# Patient Record
Sex: MALE | Race: OTHER | Employment: OTHER | URBAN - METROPOLITAN AREA
[De-identification: names, ages, dates, MRNs, and addresses within clinical notes are randomized per-mention and may not be internally consistent; named-entity substitution may affect disease eponyms.]

---

## 2020-04-29 ENCOUNTER — IMPORTED ENCOUNTER (OUTPATIENT)
Dept: URBAN - METROPOLITAN AREA CLINIC 31 | Facility: CLINIC | Age: 65
End: 2020-04-29

## 2020-04-29 PROBLEM — E11.9: Noted: 2020-04-29

## 2020-04-29 PROBLEM — E11.3393: Noted: 2020-04-29

## 2020-04-29 PROCEDURE — 92004 COMPRE OPH EXAM NEW PT 1/>: CPT

## 2020-04-29 PROCEDURE — 92250 FUNDUS PHOTOGRAPHY W/I&R: CPT

## 2020-04-29 PROCEDURE — 92015 DETERMINE REFRACTIVE STATE: CPT

## 2020-04-29 NOTE — PATIENT DISCUSSION
1. DM II without sign of Diabetic Retinopathy OU:  Discussed the pathophysiology of diabetes and its effect on the eye. Stressed the importance of regular followup and good control of BS BP and Lipids to avoid future complications. 2. Refractive error - Glasses change optional. 3.  Return for an appointment in 1 year for comprehensive exam and Optos with Dr. Angel Mello.

## 2020-04-29 NOTE — PATIENT DISCUSSION
1. 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 Stressed the importance of regular followup and good control of BS BP and Lipids to avoid future complications. 2. Refractive error - Glasses change optional. 3.  Return for an appointment in 1 year for comprehensive exam and Optos with Dr. Zachary De Leon.

## 2021-04-06 ENCOUNTER — IMPORTED ENCOUNTER (OUTPATIENT)
Dept: URBAN - METROPOLITAN AREA CLINIC 31 | Facility: CLINIC | Age: 66
End: 2021-04-06

## 2021-04-06 PROBLEM — E11.3393: Noted: 2021-04-06

## 2021-04-06 PROBLEM — E11.9: Noted: 2021-04-06

## 2021-04-06 PROCEDURE — 92014 COMPRE OPH EXAM EST PT 1/>: CPT

## 2021-04-06 PROCEDURE — 92250 FUNDUS PHOTOGRAPHY W/I&R: CPT

## 2021-04-06 NOTE — PATIENT DISCUSSION
1. 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 Advised needs to be lowered especially because of already existing non proliferative retinopathy. 2. Refractive error - Glasses change optional. 3.  Return for an appointment in 1 year for comprehensive exam and Optos with Dr. Wandy Devi.

## 2022-04-02 ASSESSMENT — VISUAL ACUITY
OS_SC: 20/200
OD_SC: 20/200
OU_CC: 20/20
OS_CC: 20/25
OD_CC: 20/80+2
OD_SC: 20/20-2
OS_SC: 20/25+2
OD_SC: 20/25
OU_SC: 20/20
OD_CC: 20/25
OS_CC: 20/80-2
OS_SC: 20/25+2

## 2022-04-02 ASSESSMENT — TONOMETRY
OD_IOP_MMHG: 16
OD_IOP_MMHG: 17
OS_IOP_MMHG: 15
OS_IOP_MMHG: 16

## 2022-04-06 ENCOUNTER — COMPREHENSIVE EXAM (OUTPATIENT)
Dept: URBAN - METROPOLITAN AREA CLINIC 29 | Facility: CLINIC | Age: 67
End: 2022-04-06

## 2022-04-06 DIAGNOSIS — H25.819: ICD-10-CM

## 2022-04-06 DIAGNOSIS — E11.3393: ICD-10-CM

## 2022-04-06 PROCEDURE — 99214 OFFICE O/P EST MOD 30 MIN: CPT

## 2022-04-06 PROCEDURE — 92250 FUNDUS PHOTOGRAPHY W/I&R: CPT

## 2022-04-06 ASSESSMENT — VISUAL ACUITY
OS_CC: 20/30
OD_CC: J1+
OS_CC: J1+
OD_CC: 20/30

## 2022-04-06 ASSESSMENT — TONOMETRY
OD_IOP_MMHG: 14
OS_IOP_MMHG: 12

## 2022-04-06 NOTE — PATIENT DISCUSSION
It was discussed with the patient the importance of good control of their blood sugar, blood pressure, cholesterol, diet, exercise and weight under the guidance of their diabetic doctor to prevent/halt diabetic retinopathy. Refer for retina consult at [address] [facility].

## 2022-05-03 ENCOUNTER — NEW PATIENT (OUTPATIENT)
Dept: URBAN - METROPOLITAN AREA CLINIC 26 | Facility: CLINIC | Age: 67
End: 2022-05-03

## 2022-05-03 VITALS
HEIGHT: 65 IN | HEART RATE: 78 BPM | DIASTOLIC BLOOD PRESSURE: 79 MMHG | WEIGHT: 140 LBS | BODY MASS INDEX: 23.32 KG/M2 | SYSTOLIC BLOOD PRESSURE: 120 MMHG

## 2022-05-03 DIAGNOSIS — H04.123: ICD-10-CM

## 2022-05-03 DIAGNOSIS — Z79.4: ICD-10-CM

## 2022-05-03 DIAGNOSIS — E11.3312: ICD-10-CM

## 2022-05-03 DIAGNOSIS — E11.3391: ICD-10-CM

## 2022-05-03 PROCEDURE — 92134 CPTRZ OPH DX IMG PST SGM RTA: CPT

## 2022-05-03 PROCEDURE — 92250 FUNDUS PHOTOGRAPHY W/I&R: CPT

## 2022-05-03 PROCEDURE — 92004 COMPRE OPH EXAM NEW PT 1/>: CPT

## 2022-05-03 PROCEDURE — 92235 FLUORESCEIN ANGRPH MLTIFRAME: CPT

## 2022-05-03 ASSESSMENT — VISUAL ACUITY
OD_CC: 20/30-2
OS_CC: 20/20-2

## 2022-05-03 ASSESSMENT — TONOMETRY
OD_IOP_MMHG: 12
OS_IOP_MMHG: 14

## 2022-08-11 ENCOUNTER — ESTABLISHED PATIENT (OUTPATIENT)
Dept: URBAN - METROPOLITAN AREA CLINIC 29 | Facility: CLINIC | Age: 67
End: 2022-08-11

## 2022-08-11 DIAGNOSIS — Z79.4: ICD-10-CM

## 2022-08-11 DIAGNOSIS — E11.3393: ICD-10-CM

## 2022-08-11 DIAGNOSIS — H25.813: ICD-10-CM

## 2022-08-11 PROCEDURE — 99214 OFFICE O/P EST MOD 30 MIN: CPT

## 2022-08-11 ASSESSMENT — VISUAL ACUITY
OS_CC: 20/25+2
OD_CC: 20/25+1

## 2022-08-11 NOTE — PATIENT DISCUSSION
It was discussed with the patient the importance of good control of their blood sugar, blood pressure, cholesterol, diet, exercise and weight under the guidance of their diabetic doctor to prevent/halt diabetic retinopathy. Refer for retina consult at [facility].

## 2023-04-10 ENCOUNTER — COMPREHENSIVE EXAM (OUTPATIENT)
Dept: URBAN - METROPOLITAN AREA CLINIC 29 | Facility: CLINIC | Age: 68
End: 2023-04-10

## 2023-04-10 DIAGNOSIS — H04.123: ICD-10-CM

## 2023-04-10 DIAGNOSIS — Z79.4: ICD-10-CM

## 2023-04-10 DIAGNOSIS — H40.033: ICD-10-CM

## 2023-04-10 DIAGNOSIS — E11.3393: ICD-10-CM

## 2023-04-10 DIAGNOSIS — H25.813: ICD-10-CM

## 2023-04-10 PROCEDURE — 92132 CPTRZD OPH DX IMG ANT SGM: CPT

## 2023-04-10 PROCEDURE — 92134 CPTRZ OPH DX IMG PST SGM RTA: CPT

## 2023-04-10 PROCEDURE — 99214 OFFICE O/P EST MOD 30 MIN: CPT

## 2023-04-10 ASSESSMENT — TONOMETRY
OD_IOP_MMHG: 16
OS_IOP_MMHG: 16

## 2023-04-10 ASSESSMENT — VISUAL ACUITY
OS_CC: 20/25
OD_CC: 20/20
OS_CC: 20/20
OD_CC: 20/25

## 2023-05-03 ENCOUNTER — FOLLOW UP (OUTPATIENT)
Dept: URBAN - METROPOLITAN AREA CLINIC 26 | Facility: CLINIC | Age: 68
End: 2023-05-03

## 2023-05-03 VITALS
SYSTOLIC BLOOD PRESSURE: 152 MMHG | WEIGHT: 148 LBS | HEART RATE: 72 BPM | DIASTOLIC BLOOD PRESSURE: 88 MMHG | HEIGHT: 64 IN | BODY MASS INDEX: 25.27 KG/M2

## 2023-05-03 DIAGNOSIS — H04.123: ICD-10-CM

## 2023-05-03 DIAGNOSIS — E11.3312: ICD-10-CM

## 2023-05-03 DIAGNOSIS — E11.3391: ICD-10-CM

## 2023-05-03 DIAGNOSIS — Z79.4: ICD-10-CM

## 2023-05-03 PROCEDURE — 92134 CPTRZ OPH DX IMG PST SGM RTA: CPT

## 2023-05-03 PROCEDURE — 92250 FUNDUS PHOTOGRAPHY W/I&R: CPT

## 2023-05-03 PROCEDURE — 92235 FLUORESCEIN ANGRPH MLTIFRAME: CPT

## 2023-05-03 PROCEDURE — 92014 COMPRE OPH EXAM EST PT 1/>: CPT

## 2023-05-03 ASSESSMENT — VISUAL ACUITY
OS_PH: 20/20
OS_CC: 20/30-1
OD_CC: 20/25-2

## 2023-05-03 ASSESSMENT — TONOMETRY
OS_IOP_MMHG: 13
OD_IOP_MMHG: 15

## 2023-08-22 ENCOUNTER — CONSULTATION/EVALUATION (OUTPATIENT)
Dept: URBAN - METROPOLITAN AREA CLINIC 29 | Facility: CLINIC | Age: 68
End: 2023-08-22

## 2023-08-22 DIAGNOSIS — H40.033: ICD-10-CM

## 2023-08-22 DIAGNOSIS — H25.813: ICD-10-CM

## 2023-08-22 PROCEDURE — 99214 OFFICE O/P EST MOD 30 MIN: CPT

## 2023-08-22 ASSESSMENT — VISUAL ACUITY
OU_CC: 20/20
OS_CC: 20/20
OD_CC: 20/20